# Patient Record
Sex: FEMALE | Race: WHITE | URBAN - METROPOLITAN AREA
[De-identification: names, ages, dates, MRNs, and addresses within clinical notes are randomized per-mention and may not be internally consistent; named-entity substitution may affect disease eponyms.]

---

## 2023-03-03 ENCOUNTER — WEB ENCOUNTER (OUTPATIENT)
Dept: URBAN - METROPOLITAN AREA CLINIC 80 | Facility: CLINIC | Age: 28
End: 2023-03-03

## 2023-03-03 ENCOUNTER — OFFICE VISIT (OUTPATIENT)
Dept: URBAN - METROPOLITAN AREA CLINIC 80 | Facility: CLINIC | Age: 28
End: 2023-03-03
Payer: COMMERCIAL

## 2023-03-03 VITALS — BODY MASS INDEX: 20.84 KG/M2 | TEMPERATURE: 97.4 F | HEIGHT: 70 IN | WEIGHT: 145.6 LBS

## 2023-03-03 DIAGNOSIS — R19.4 CHANGE IN BOWEL HABITS: ICD-10-CM

## 2023-03-03 DIAGNOSIS — Q87.40 MARFAN SYNDROME: ICD-10-CM

## 2023-03-03 DIAGNOSIS — R19.7 DIARRHEA, UNSPECIFIED TYPE: ICD-10-CM

## 2023-03-03 PROBLEM — 19346006: Status: ACTIVE | Noted: 2023-03-03

## 2023-03-03 PROCEDURE — 99203 OFFICE O/P NEW LOW 30 MIN: CPT | Performed by: INTERNAL MEDICINE

## 2023-03-03 RX ORDER — SPIRONOLACTONE 25 MG/1
TABLET ORAL
Qty: 30 TABLET | Status: ACTIVE | COMMUNITY

## 2023-03-03 RX ORDER — HYOSCYAMINE SULFATE 0.12 MG/1
1 TABLET UNDER THE TONGUE AND ALLOW TO DISSOLVE  AS NEEDED TABLET, ORALLY DISINTEGRATING ORAL
Qty: 40 | Refills: 1 | OUTPATIENT
Start: 2023-03-03 | End: 2023-05-02

## 2023-03-03 RX ORDER — ESTRADIOL 2 MG/1
TABLET ORAL
Qty: 120 TABLET | Status: ACTIVE | COMMUNITY

## 2023-03-03 RX ORDER — FINASTERIDE 5 MG/1
TABLET, FILM COATED ORAL
Qty: 30 TABLET | Status: ACTIVE | COMMUNITY

## 2023-03-03 NOTE — HPI-TODAY'S VISIT:
26 yo trans F  for 3 years "issues".  Started around time of Abx for a worm This was after eating sushi +bloating intermittent diarrhea constipation Bloating a little better on low FODMAP on liquids Smell of stool is "sweet" When in the diarrhea.

## 2023-03-31 ENCOUNTER — OFFICE VISIT (OUTPATIENT)
Dept: URBAN - METROPOLITAN AREA CLINIC 80 | Facility: CLINIC | Age: 28
End: 2023-03-31

## 2023-04-27 ENCOUNTER — TELEPHONE ENCOUNTER (OUTPATIENT)
Dept: URBAN - METROPOLITAN AREA CLINIC 19 | Facility: CLINIC | Age: 28
End: 2023-04-27

## 2023-04-27 RX ORDER — HYOSCYAMINE SULFATE 0.12 MG/1
1 TABLET UNDER THE TONGUE AND ALLOW TO DISSOLVE AS NEEDED TABLET, ORALLY DISINTEGRATING ORAL THREE TIMES A DAY
Qty: 40 | Refills: 1
Start: 2023-03-03 | End: 2023-06-26

## 2023-06-17 ENCOUNTER — LAB OUTSIDE AN ENCOUNTER (OUTPATIENT)
Dept: URBAN - METROPOLITAN AREA CLINIC 80 | Facility: CLINIC | Age: 28
End: 2023-06-17

## 2023-06-21 ENCOUNTER — TELEPHONE ENCOUNTER (OUTPATIENT)
Dept: URBAN - METROPOLITAN AREA CLINIC 80 | Facility: CLINIC | Age: 28
End: 2023-06-21

## 2023-06-21 LAB
ADENOVIRUS F 40/41: NOT DETECTED
CALPROTECTIN, STOOL - QDX: (no result)
CAMPYLOBACTER: NOT DETECTED
CLOSTRIDIUM DIFFICILE: NOT DETECTED
CRYPTOSPORIDIUM: NOT DETECTED
CYCLOSPORA CAYETANESIS: NOT DETECTED
E. COLI O157: (no result)
ENTAMOEBA HISTOLYTICA: NOT DETECTED
ENTAMOEBA HISTOLYTICA: NOT DETECTED
ENTEROAGGREGATIVE E.COLI: NOT DETECTED
ENTEROTOXIGENIC E.COLI: NOT DETECTED
ESCHERICHIA COLI O157: NOT DETECTED
GIARDIA LAMBLIA: NOT DETECTED
NOROVIRUS GI/GII: NOT DETECTED
NOROVIRUS GI/GII: NOT DETECTED
PANCREATICELASTASE ELISA, STOOL: (no result)
ROTAVIRUS A: NOT DETECTED
SHIGA-LIKE TOXIN PRODUCING E.COLI: DETECTED
SHIGA-LIKE TOXIN PRODUCING E.COLI: NOT DETECTED
SHIGELLA SPP. / ENTEROINVASIVE E.COLI: DETECTED
VIBRIO CHOLERAE: NOT DETECTED
VIBRIO SPP.: NOT DETECTED
YERSINIA ENTEROCOLITICA: NOT DETECTED
YERSINIA ENTEROCOLITICA: NOT DETECTED

## 2023-06-21 RX ORDER — CIPROFLOXACIN HYDROCHLORIDE 250 MG/1
1 TABLET TABLET, FILM COATED ORAL
Qty: 6 | OUTPATIENT
Start: 2023-06-21 | End: 2023-06-24

## 2023-07-11 ENCOUNTER — TELEPHONE ENCOUNTER (OUTPATIENT)
Dept: URBAN - METROPOLITAN AREA CLINIC 80 | Facility: CLINIC | Age: 28
End: 2023-07-11

## 2023-07-11 RX ORDER — HYOSCYAMINE SULFATE 0.12 MG/1
1 TABLET UNDER THE TONGUE AND ALLOW TO DISSOLVE AS NEEDED TABLET, ORALLY DISINTEGRATING ORAL THREE TIMES A DAY
Qty: 40 | Refills: 1
Start: 2023-03-03 | End: 2023-09-09

## 2023-09-15 ENCOUNTER — OFFICE VISIT (OUTPATIENT)
Dept: URBAN - METROPOLITAN AREA CLINIC 80 | Facility: CLINIC | Age: 28
End: 2023-09-15
Payer: COMMERCIAL

## 2023-09-15 VITALS — TEMPERATURE: 97.7 F | WEIGHT: 145.6 LBS | HEIGHT: 70 IN | BODY MASS INDEX: 20.84 KG/M2

## 2023-09-15 DIAGNOSIS — Q87.40 MARFAN SYNDROME: ICD-10-CM

## 2023-09-15 DIAGNOSIS — K58.8 OTHER IRRITABLE BOWEL SYNDROME: ICD-10-CM

## 2023-09-15 DIAGNOSIS — R19.7 DIARRHEA, UNSPECIFIED TYPE: ICD-10-CM

## 2023-09-15 PROBLEM — 10743008: Status: ACTIVE | Noted: 2023-09-15

## 2023-09-15 PROCEDURE — 99214 OFFICE O/P EST MOD 30 MIN: CPT | Performed by: INTERNAL MEDICINE

## 2023-09-15 RX ORDER — ESTRADIOL 2 MG/1
TABLET ORAL
Qty: 120 TABLET | Status: ACTIVE | COMMUNITY

## 2023-09-15 RX ORDER — FINASTERIDE 5 MG/1
TABLET, FILM COATED ORAL
Qty: 30 TABLET | Status: ACTIVE | COMMUNITY

## 2023-09-15 RX ORDER — RIFAXIMIN 550 MG/1
1 TABLET TABLET ORAL THREE TIMES A DAY
Qty: 42 TABLET | Refills: 1 | OUTPATIENT
Start: 2023-09-15 | End: 2023-10-13

## 2023-09-15 RX ORDER — SPIRONOLACTONE 25 MG/1
TABLET ORAL
Qty: 30 TABLET | Status: ACTIVE | COMMUNITY

## 2023-09-15 NOTE — HPI-TODAY'S VISIT:
EPEC, was given Cipro did seem to improve things did well for a couple of weeks; then things went back to constipation and diarrhea lots bloating.  Hyoscyomine helps a lot.

## 2023-10-31 ENCOUNTER — TELEPHONE ENCOUNTER (OUTPATIENT)
Dept: URBAN - METROPOLITAN AREA CLINIC 80 | Facility: CLINIC | Age: 28
End: 2023-10-31

## 2023-10-31 ENCOUNTER — WEB ENCOUNTER (OUTPATIENT)
Dept: URBAN - METROPOLITAN AREA CLINIC 80 | Facility: CLINIC | Age: 28
End: 2023-10-31

## 2023-10-31 RX ORDER — HYOSCYAMINE SULFATE 0.12 MG/1
1 TABLET UNDER THE TONGUE AND ALLOW TO DISSOLVE AS NEEDED TABLET, ORALLY DISINTEGRATING ORAL THREE TIMES A DAY
Qty: 40 | Refills: 1
Start: 2023-03-03 | End: 2023-12-30

## 2023-10-31 RX ORDER — HYOSCYAMINE SULFATE 0.12 MG/1
1 TABLET UNDER THE TONGUE AND ALLOW TO DISSOLVE AS NEEDED TABLET, ORALLY DISINTEGRATING ORAL THREE TIMES A DAY
Qty: 40 | Refills: 3
Start: 2023-03-03 | End: 2024-02-28

## 2023-11-07 ENCOUNTER — OFFICE VISIT (OUTPATIENT)
Dept: URBAN - METROPOLITAN AREA TELEHEALTH 2 | Facility: TELEHEALTH | Age: 28
End: 2023-11-07

## 2023-11-07 ENCOUNTER — TELEPHONE ENCOUNTER (OUTPATIENT)
Dept: URBAN - METROPOLITAN AREA CLINIC 80 | Facility: CLINIC | Age: 28
End: 2023-11-07

## 2023-11-07 RX ORDER — ESTRADIOL 2 MG/1
TABLET ORAL
Qty: 120 TABLET | Status: ACTIVE | COMMUNITY

## 2023-11-07 RX ORDER — FINASTERIDE 5 MG/1
TABLET, FILM COATED ORAL
Qty: 30 TABLET | Status: ACTIVE | COMMUNITY

## 2023-11-07 RX ORDER — HYOSCYAMINE SULFATE 0.12 MG/1
1 TABLET UNDER THE TONGUE AND ALLOW TO DISSOLVE AS NEEDED TABLET, ORALLY DISINTEGRATING ORAL THREE TIMES A DAY
Qty: 40 | Refills: 1 | Status: ACTIVE | COMMUNITY
Start: 2023-03-03 | End: 2023-12-30

## 2023-11-07 RX ORDER — SPIRONOLACTONE 25 MG/1
TABLET ORAL
Qty: 30 TABLET | Status: ACTIVE | COMMUNITY

## 2023-11-08 ENCOUNTER — DASHBOARD ENCOUNTERS (OUTPATIENT)
Age: 28
End: 2023-11-08

## 2023-11-08 ENCOUNTER — OFFICE VISIT (OUTPATIENT)
Dept: URBAN - METROPOLITAN AREA TELEHEALTH 2 | Facility: TELEHEALTH | Age: 28
End: 2023-11-08
Payer: COMMERCIAL

## 2023-11-08 DIAGNOSIS — K58.8 OTHER IRRITABLE BOWEL SYNDROME: ICD-10-CM

## 2023-11-08 DIAGNOSIS — Q87.40 MARFAN SYNDROME: ICD-10-CM

## 2023-11-08 DIAGNOSIS — R19.7 ACUTE DIARRHEA: ICD-10-CM

## 2023-11-08 PROCEDURE — 99214 OFFICE O/P EST MOD 30 MIN: CPT | Performed by: INTERNAL MEDICINE

## 2023-11-08 RX ORDER — SPIRONOLACTONE 25 MG/1
TABLET ORAL
Qty: 30 TABLET | Status: ACTIVE | COMMUNITY

## 2023-11-08 RX ORDER — HYOSCYAMINE SULFATE 0.12 MG/1
1 TABLET UNDER THE TONGUE AND ALLOW TO DISSOLVE AS NEEDED TABLET, ORALLY DISINTEGRATING ORAL THREE TIMES A DAY
Qty: 40 | Refills: 1 | Status: ACTIVE | COMMUNITY
Start: 2023-03-03 | End: 2023-12-30

## 2023-11-08 RX ORDER — FINASTERIDE 5 MG/1
TABLET, FILM COATED ORAL
Qty: 30 TABLET | Status: ACTIVE | COMMUNITY

## 2023-11-08 RX ORDER — ESTRADIOL 2 MG/1
TABLET ORAL
Qty: 120 TABLET | Status: ACTIVE | COMMUNITY

## 2023-11-08 NOTE — HPI-TODAY'S VISIT:
Xifaxan helped, but after a week or 2 recurred to prior BM pattern.   Levsin helps a lot.   Pattern now: usually drinks a shake in the morning, takes Hyoscyamine and then uses the rest room and then improves. Occ a second BM later in the day.

## 2023-12-04 ENCOUNTER — CLAIMS CREATED FROM THE CLAIM WINDOW (OUTPATIENT)
Dept: URBAN - METROPOLITAN AREA SURGERY CENTER 19 | Facility: SURGERY CENTER | Age: 28
End: 2023-12-04
Payer: COMMERCIAL

## 2023-12-04 DIAGNOSIS — R19.7 DIARRHEA, UNSPECIFIED TYPE: ICD-10-CM

## 2023-12-04 DIAGNOSIS — K62.89 ERYTHEMA OF RECTUM: ICD-10-CM

## 2023-12-04 DIAGNOSIS — K62.89 OTHER SPECIFIED DISEASES OF ANUS AND RECTUM: ICD-10-CM

## 2023-12-04 DIAGNOSIS — R19.7 DIARRHEA: ICD-10-CM

## 2023-12-04 PROCEDURE — 00811 ANES LWR INTST NDSC NOS: CPT | Performed by: NURSE ANESTHETIST, CERTIFIED REGISTERED

## 2023-12-04 PROCEDURE — 45380 COLONOSCOPY AND BIOPSY: CPT | Performed by: INTERNAL MEDICINE

## 2023-12-04 PROCEDURE — G8907 PT DOC NO EVENTS ON DISCHARG: HCPCS | Performed by: INTERNAL MEDICINE

## 2023-12-04 RX ORDER — SPIRONOLACTONE 25 MG/1
TABLET ORAL
Qty: 30 TABLET | Status: ACTIVE | COMMUNITY

## 2023-12-04 RX ORDER — FINASTERIDE 5 MG/1
TABLET, FILM COATED ORAL
Qty: 30 TABLET | Status: ACTIVE | COMMUNITY

## 2023-12-04 RX ORDER — HYOSCYAMINE SULFATE 0.12 MG/1
1 TABLET UNDER THE TONGUE AND ALLOW TO DISSOLVE AS NEEDED TABLET, ORALLY DISINTEGRATING ORAL THREE TIMES A DAY
Qty: 40 | Refills: 1 | Status: ACTIVE | COMMUNITY
Start: 2023-03-03 | End: 2023-12-30

## 2023-12-04 RX ORDER — ESTRADIOL 2 MG/1
TABLET ORAL
Qty: 120 TABLET | Status: ACTIVE | COMMUNITY

## 2024-01-26 NOTE — PHYSICAL EXAM CONSTITUTIONAL:
Quality 110: Preventive Care And Screening: Influenza Immunization: Influenza Immunization Administered during Influenza season well developed, well nourished , in no acute distress , ambulating without difficulty , normal communication ability Detail Level: Detailed